# Patient Record
Sex: MALE | Race: BLACK OR AFRICAN AMERICAN | NOT HISPANIC OR LATINO | Employment: UNEMPLOYED | URBAN - METROPOLITAN AREA
[De-identification: names, ages, dates, MRNs, and addresses within clinical notes are randomized per-mention and may not be internally consistent; named-entity substitution may affect disease eponyms.]

---

## 2023-01-30 ENCOUNTER — OFFICE VISIT (OUTPATIENT)
Dept: URGENT CARE | Facility: CLINIC | Age: 23
End: 2023-01-30
Payer: COMMERCIAL

## 2023-01-30 VITALS
HEIGHT: 71 IN | WEIGHT: 200.31 LBS | DIASTOLIC BLOOD PRESSURE: 81 MMHG | RESPIRATION RATE: 18 BRPM | SYSTOLIC BLOOD PRESSURE: 138 MMHG | TEMPERATURE: 99 F | BODY MASS INDEX: 28.04 KG/M2 | HEART RATE: 66 BPM | OXYGEN SATURATION: 98 %

## 2023-01-30 DIAGNOSIS — J02.9 ACUTE PHARYNGITIS, UNSPECIFIED ETIOLOGY: Primary | ICD-10-CM

## 2023-01-30 DIAGNOSIS — J02.9 SORE THROAT: ICD-10-CM

## 2023-01-30 DIAGNOSIS — R09.89 SYMPTOMS OF UPPER RESPIRATORY INFECTION (URI): ICD-10-CM

## 2023-01-30 LAB
CTP QC/QA: YES
CTP QC/QA: YES
POC MOLECULAR INFLUENZA A AGN: NEGATIVE
POC MOLECULAR INFLUENZA B AGN: NEGATIVE
SARS-COV-2 RDRP RESP QL NAA+PROBE: NEGATIVE
STREP A PCR (OHS): NOT DETECTED

## 2023-01-30 PROCEDURE — 99214 OFFICE O/P EST MOD 30 MIN: CPT | Mod: S$PBB,,, | Performed by: NURSE PRACTITIONER

## 2023-01-30 PROCEDURE — 99214 PR OFFICE/OUTPT VISIT, EST, LEVL IV, 30-39 MIN: ICD-10-PCS | Mod: S$PBB,,, | Performed by: NURSE PRACTITIONER

## 2023-01-30 PROCEDURE — 87651 STREP A DNA AMP PROBE: CPT | Performed by: NURSE PRACTITIONER

## 2023-01-30 PROCEDURE — 87635 SARS-COV-2 COVID-19 AMP PRB: CPT | Mod: PBBFAC | Performed by: NURSE PRACTITIONER

## 2023-01-30 PROCEDURE — 99204 OFFICE O/P NEW MOD 45 MIN: CPT | Mod: PBBFAC | Performed by: NURSE PRACTITIONER

## 2023-01-30 PROCEDURE — 87502 INFLUENZA DNA AMP PROBE: CPT | Mod: PBBFAC | Performed by: NURSE PRACTITIONER

## 2023-01-30 RX ORDER — AMOXICILLIN 875 MG/1
875 TABLET, FILM COATED ORAL 2 TIMES DAILY
Qty: 20 TABLET | Refills: 0 | Status: SHIPPED | OUTPATIENT
Start: 2023-01-30 | End: 2023-02-09

## 2023-01-30 NOTE — LETTER
January 30, 2023      [facility]  [facility] [address]  Phone: [phone]       Patient: Remy Narayanan   YOB: 2000  Date of Visit: 01/30/2023    To Whom It May Concern:    Kelli Narayanan  was at [facility] on 01/30/2023. The patient may return to work/school on 02/02/2023 with no restrictions. If you have any questions or concerns, or if I can be of further assistance, please do not hesitate to contact me.    Sincerely,      Taz Wilson, NP

## 2023-01-30 NOTE — PROGRESS NOTES
"Subjective:       Patient ID: Remy Narayanan is a 22 y.o. male.    Vitals:  height is 5' 11" (1.803 m) and weight is 90.9 kg (200 lb 4.8 oz). His oral temperature is 99.3 °F (37.4 °C). His blood pressure is 138/81 and his pulse is 66. His respiration is 18 and oxygen saturation is 98%.     Chief Complaint: Sore Throat (xLast night) and Nasal Congestion    CC as above.       Constitution: Negative.   HENT:  Positive for congestion and sore throat.    Neck: neck negative.   Cardiovascular: Negative.    Respiratory: Negative.       Objective:      Physical Exam   Constitutional: He is oriented to person, place, and time. He appears well-developed.   HENT:   Head: Normocephalic.   Ears:   Right Ear: Tympanic membrane normal.   Left Ear: Tympanic membrane normal.   Nose: Congestion present.   Mouth/Throat: Posterior oropharyngeal erythema present.   Eyes: Conjunctivae and EOM are normal. Pupils are equal, round, and reactive to light.   Neck: Neck supple.   Cardiovascular: Normal rate, regular rhythm and normal heart sounds.   Pulmonary/Chest: Effort normal and breath sounds normal.   Musculoskeletal: Normal range of motion.         General: Normal range of motion.   Neurological: He is alert and oriented to person, place, and time.   Skin: Skin is warm and dry.   Psychiatric: His behavior is normal.   Vitals reviewed.      Assessment:       1. Acute pharyngitis, unspecified etiology    2. Symptoms of upper respiratory infection (URI)    3. Sore throat              Office Visit on 01/30/2023   Component Date Value Ref Range Status    POC Rapid COVID 01/30/2023 Negative  Negative Final     Acceptable 01/30/2023 Yes   Final    POC Molecular Influenza A Ag 01/30/2023 Negative  Negative, Not Reported Final    POC Molecular Influenza B Ag 01/30/2023 Negative  Negative, Not Reported Final     Acceptable 01/30/2023 Yes   Final        No results found.   Plan:           Strep PCR pending, will treat " based on clinical symptoms.  Start medication, change toothbrush after 3 days of antibiotic.  Saltwater gargles.  May use acetaminophen alternate with ibuprofen as directed for comfort.  ER precautions.    Acute pharyngitis, unspecified etiology  -     amoxicillin (AMOXIL) 875 MG tablet; Take 1 tablet (875 mg total) by mouth 2 (two) times daily. for 10 days  Dispense: 20 tablet; Refill: 0    Symptoms of upper respiratory infection (URI)  -     POCT COVID-19 Rapid Screening  -     POCT Influenza A/B Molecular    Sore throat  -     Strep Group A by PCR; Future; Expected date: 01/30/2023